# Patient Record
Sex: FEMALE | Race: WHITE | Employment: OTHER | URBAN - NONMETROPOLITAN AREA
[De-identification: names, ages, dates, MRNs, and addresses within clinical notes are randomized per-mention and may not be internally consistent; named-entity substitution may affect disease eponyms.]

---

## 2020-04-20 ENCOUNTER — HOSPITAL ENCOUNTER (OUTPATIENT)
Dept: ULTRASOUND IMAGING | Facility: OTHER | Age: 65
End: 2020-04-20
Attending: PHYSICIAN ASSISTANT
Payer: MEDICARE

## 2020-04-20 VITALS — RESPIRATION RATE: 16 BRPM | DIASTOLIC BLOOD PRESSURE: 78 MMHG | HEART RATE: 80 BPM | SYSTOLIC BLOOD PRESSURE: 140 MMHG

## 2020-04-20 DIAGNOSIS — N64.4 BREAST PAIN, LEFT: ICD-10-CM

## 2020-04-20 DIAGNOSIS — R92.8 ABNORMAL FINDING ON BREAST IMAGING: ICD-10-CM

## 2020-04-20 PROCEDURE — 76642 ULTRASOUND BREAST LIMITED: CPT | Mod: LT

## 2020-04-20 RX ORDER — LIDOCAINE HYDROCHLORIDE 10 MG/ML
20 INJECTION, SOLUTION EPIDURAL; INFILTRATION; INTRACAUDAL; PERINEURAL ONCE
Status: DISCONTINUED | OUTPATIENT
Start: 2020-04-20 | End: 2020-04-21 | Stop reason: HOSPADM

## 2020-04-20 RX ORDER — LIDOCAINE HYDROCHLORIDE AND EPINEPHRINE 10; 10 MG/ML; UG/ML
20 INJECTION, SOLUTION INFILTRATION; PERINEURAL ONCE
Status: DISCONTINUED | OUTPATIENT
Start: 2020-04-20 | End: 2020-04-21 | Stop reason: HOSPADM

## 2020-04-20 RX ORDER — MULTIPLE VITAMINS W/ MINERALS TAB 9MG-400MCG
1 TAB ORAL DAILY
COMMUNITY

## 2020-04-20 RX ORDER — CHLORAL HYDRATE 500 MG
2 CAPSULE ORAL DAILY
COMMUNITY

## 2020-04-20 RX ORDER — ATORVASTATIN CALCIUM 40 MG/1
40 TABLET, FILM COATED ORAL DAILY
COMMUNITY

## 2020-04-20 RX ORDER — CHOLECALCIFEROL (VITAMIN D3) 50 MCG
1 TABLET ORAL DAILY
COMMUNITY

## 2020-04-20 NOTE — DISCHARGE INSTRUCTIONS
"NEEDLE BIOPSY BREAST    Activity: Rest the remainder of the day. You may resume normal activity after the next day. Avoid any vigorous/strenuous physical activity for 24 hours.    Comfort: If you have discomfort or tenderness at the site you may take your usual or recommended pain medication. Do not take aspirin the day of the procedure or for 48 hours following the biopsy.    Diet: You may resume your usual diet.    Care of site: Leave ice pack in place for 4 hours, or until it is no longer cold. The ice pack is reusable and may be refrozen.  Keep your bra and the dressing on for 24 hours. Then you may remove the bandage and shower. If there are steri-strips you may remove them in 3 to 5 days.  You may have some discomfort and a small amount of bruising where the biopsy was performed. This is normal. For several days or even a couple of weeks, you may have tenderness or \"twinges\" and a tiny bump where the needle went into the skin. This can be bothersome, but is not abnormal. You can use warm moist washcloths, as this may help. Do Not Use A Heating Pad.    RETURN TO THE EMERGENCY ROOM FOR:   Shortness of breath   Rapid heart rate   If pain becomes worse    Call Your Doctor For:    A fever over 101 degrees   Increased redness, increased swelling, and/or persistent drainage/discomfort  around the site    Other: At the end of your breast biopsy, a tiny titanium clip will be inserted through the biopsy needle and placed at the biopsy site within your breast. The marker provides a landmark of the biopsy for further mammograms or surgical procedures. This marker is MRI compatible and poses no known health risks.    You will be receiving a letter in the mail from [facility] Mammography Department with your biopsy results.  In 6 months a mammogram will be needed to establish a new baseline and to recheck the area where the biopsy occurred. Our radiology department will call you to schedule an appointment.    For " questions, problems or concerns, contact the Radiology Department at [phone].

## 2020-04-20 NOTE — PROGRESS NOTES
Ultrasound performed.  Radiologist reviews and talks with patient about there not being a clear area to biopsy at this time.  Patient states pain has been better over the past few days.  Please see radiologist report.  Message left at [facility] for Diann Acevedo.

## 2020-04-20 NOTE — LETTER
Norma Reed  [address]  [place] [address]      April 21, 2020  Date of Exam: 4/20/20    Dear Norma Reed:    Thank you for your recent visit.  Breast Imaging Result: We are pleased to inform you that the results of your recent breast imaging show no evidence of malignancy (cancer).    If you are experiencing any breast problems such as a lump or localized pain we request that you discuss this with your health care provider if you haven t already done so, as additional testing may be necessary.    As you know, early detection of cancer is very important. Although mammography is the most accurate method for early detection, not all cancers are found through mammography. A thorough examination includes a combination of mammography, physical examination and breast self-examination. Currently the American College of Radiology and the Society of Breast Imaging recommend an annual mammogram for all women beginning at the age of 40.    A report of your breast imaging results was sent to: Diann Acevedo    Your breast imaging will become part of your medical file here at [place] for at least 10 years. You are responsible for informing any new health care provider or breast imaging facility of the date and location of this examination.    We appreciate the opportunity to participate in your health care.    Sincerely,    Kole Valverde MD  Interpreting Radiologist  [facility]

## 2021-07-20 ENCOUNTER — ONCOLOGY VISIT (OUTPATIENT)
Dept: RADIATION ONCOLOGY | Facility: HOSPITAL | Age: 66
End: 2021-07-20
Attending: INTERNAL MEDICINE
Payer: COMMERCIAL

## 2021-07-20 VITALS — WEIGHT: 220 LBS

## 2021-07-20 DIAGNOSIS — C23 MALIGNANT NEOPLASM OF GALLBLADDER (H): ICD-10-CM

## 2021-07-20 PROCEDURE — 99205 OFFICE O/P NEW HI 60 MIN: CPT | Performed by: RADIOLOGY

## 2021-07-20 PROCEDURE — G0463 HOSPITAL OUTPT CLINIC VISIT: HCPCS | Performed by: RADIOLOGY

## 2021-07-20 PROCEDURE — 99417 PROLNG OP E/M EACH 15 MIN: CPT | Performed by: RADIOLOGY

## 2021-07-20 RX ORDER — MULTIPLE VITAMINS W/ MINERALS TAB 9MG-400MCG
1 TAB ORAL
COMMUNITY
End: 2021-08-03

## 2021-07-20 RX ORDER — CHLORAL HYDRATE 500 MG
CAPSULE ORAL
COMMUNITY
End: 2021-08-03

## 2021-07-20 RX ORDER — IPRATROPIUM BROMIDE AND ALBUTEROL SULFATE 2.5; .5 MG/3ML; MG/3ML
SOLUTION RESPIRATORY (INHALATION)
COMMUNITY
Start: 2020-11-24

## 2021-07-20 RX ORDER — ATORVASTATIN CALCIUM 40 MG/1
20 TABLET, FILM COATED ORAL
COMMUNITY
Start: 2020-11-16

## 2021-07-20 NOTE — PROGRESS NOTES
"[facility]  DEPARTMENT OF RADIATION ONCOLOGY  CONSULTATION NOTE    Name: Norma Reed MRN: [id number]   : 1955 (66 year old)  Date of Service: 2021 Referring: Dr. Marcano       Diagnosis and Cancer Staging  Malignant neoplasm of gallbladder (H)  Staging form: Gallbladder, AJCC 8th Edition  - Clinical stage from 2021: Stage IIIB (cT2b, cN1, cM0) - Signed by Jordan Puri MD on 2021  - Pathologic stage from 2021: Stage IIIB (pT2b, pN1, cM0) - Signed by Jordan Puri MD on 2021      Summary of Problems   (Please note that EMR interfaces between institutions can result in loss of the embedded images).     \"Kimberly" is a very pleasant 66 year old woman who is actively treated for gallbladder cancer. Medical oncology referred the patient for consultation about the role of adjuvant concurrent chemoradiation (Primary: Upfront resection yielded 6.5-cm disease involving the entire length of the gallbladder, extension through the muscularis propria into surrounding adipose tissue, and positive liver parenchymal margin on initial resection. Radial margins clinically suspected to be positive. Subsequent resection of liver segments 4B/5 negative for malignancy after adjuvant chemotherapy.. Nodes: Clinically presumed 2.2-cm node of Calot removed at time of cholecystectomy and only designated as \"M1\" disease. Likely 2.1-cm retroperitoneal node behind the portal vein. 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 Markers: Postoperatively rising CA 19-9 measuring 560 on 2021). Note that the clinical and pathologic staging is based upon postsurgical interpretation of the patient's disease. Cholecystectomy on 2021 revealed an incidental locally advanced gallbladder cancer. \"Neoadjuvant\" FOLFOX chemotherapy completed on 2021. Partial hepatectomy and lymphadenectomy performed on 2021. Among the additional co-morbidities " "and social determinants affecting toxicity risk are obesity, gastroesophageal reflux disease, and chronic obstructive pulmonary disease (COPD),  7/1/2021      History of Present Illness (CE)   The patient's oncologic history across multiple institutions is abstracted as follows:    Diagnosis/Staging/Treatment:    1/13/2021 Elective cholecystectomy: Presented with persistent right upper quadrant abdominal pain and food intolerances. Operative note describes incidental finding of a firm gallbladder. No description of peritoneal or hepatic suspicious findings. Pathology revealed a 6.5-cm moderately-differentiated invasive adenocarcinoma involving the entire bladder and extending through the muscularis propria into the surrounding adipose tissue. Liver parenchymal margin was positive; cystic duct margin negative by 3-mm. No peritoneal disease reported during the procedure. Designated as pathologic [id number] diease (metastatic site thought to be a 2.2-cm separate nodule that was not designated as a lymph node).    1/20/2021 CT abdomen and pelvis with contrast: Postoperative changes. No reported suspicious abdominal or pelvic masses (reinterpreted on 1/22/2021 by surgical oncology).    1/22/2021 Surgical oncology consultation: Surgical radial margins felt to be clinically suspicious. Image review suspicious for a retroperitoneal node behind the portal vein measuring measuring 2.1-cm with central hypoenhancement. Designated as clinical [id number] with a likely completely replaced node of Calot rather than intraperitoneal metastasis. Oncology team ultimately recommended \"neoadjuvant\" chemotherapy followed by re-evaluation for a \"definitive\" resection.    2/1/2021 FOLFOX chemotherapy initiated.    4/12/2021 FOLFOX chemotherapy completed. 6 cycles received.    5/24/2021 Laparoscopic procedure converted to open portal lymphadenectomy: Liver specimen (4B and 5) was negative for residual malignancy. At least one node positive for " "mucinous adenocarcinoma and demonstrated extranodal extension.    2021 CA 19-9 tumor marker: Serial rises from post chemotherapy 48 on 2021, postsurgical 145 on 2021, and 560 on 2021.    2021 CT of chest, abdomen, and pelvis with contrast: Clinically suspicious possible node posterior to the pancreas. Radiographically, reported \"low density but slightly heterogeneous material\" present along the medial aspect of the right lobe resection of uncertain etiology. Postoperative change versus early recurrence of neoplasm considered. No suspicious regional adenopathy or distant metastatic disease.     2021 Medical oncology follow-up: Oncology team regarded the prognosis as poor. Salvage therapy unlikely to achieve a curative outcome. Salvage therapy also unlikely to achieve palliation since the presumed recurrence is asymptomatic. Nevertheless, the patient is highly functional with an ECOG status of 0 and interested in seeking potentially curative treatment. Surgery deemed not appropriate, but team recommended consideration of chemotherapy or concurrent chemoradiation (5-FU) due to suspected persistent disease in the operative bed as suggested by the suspicious CT findings and rising CA 19-9 tumor marker. Patient expressed a preference for chemoradiation with potentially curative intent. Patient seeking second opinion at [facility].    Pendin2022 Consultation at [facility] for second opinion. CT scheduled later in the month.    We reviewed other conditions that can influence the choice of therapy and its morbidity. Regarding goals of care, the patient stated, \"I have a lot of things I want to do exhalation). She is accustomed to when active physically and social lifestyle. We spoke about her experiences to date with oncologic therapies and the impact of various toxicities on her life and livelihood. She spoke about the \"emotional toll on pressure\" that her disease has taken on her and her family " since her original diagnosis and the increasing pressures with each subsequent therapeutic intervention. She denies a history of Crohn's disease, ulcerative, or collagen vascular diseases. She denies a prior history of radiation exposure. We counseled the patient about COVID-19 risks, precautions, and potential impact on her radiation therapy. She denied recent known exposure to COVID-19, risky behaviors, or related symptoms including febrile, chest, gustatory, gastrointestinal, and systemic complaints.    Chemotherapy History: Yes (as above).  Radiation History: No.  Implanted Cardiac Devices: No.  Implanted Chest Wall Infusion Port: Left chest wall port.    Next 5 appointments (look out 90 days)    2021  9:30 AM  CONSULT with Jordan Puri MD  [facility] ([facility] ) [address] [place] [id number] [phone]        Past Medical History:   Diagnosis Date     COPD (chronic obstructive pulmonary disease) (H)      COVID-19     required oxygen at home and dexamethasone     Dyslipidemia      GERD (gastroesophageal reflux disease)      Malignant neoplasm of gallbladder (H) 2021     Obesity      Past Surgical History:   Procedure Laterality Date      SECTION      twice     CHOLECYSTECTOMY  2021     HEPATECTOMY PARTIAL  2021     TUBAL LIGATION       Outpatient Encounter Medications as of 2021   Medication Sig Dispense Refill     atorvastatin (LIPITOR) 40 MG tablet Take 20 mg by mouth       fluticasone-salmeterol (ADVAIR DISKUS) 250-50 MCG/DOSE inhaler INHALE 1 DOSE BY MOUTH TWICE DAILY       atorvastatin (LIPITOR) 40 MG tablet Take 40 mg by mouth daily       fish oil-omega-3 fatty acids 1000 MG capsule Take 2 g by mouth daily       ipratropium - albuterol 0.5 mg/2.5 mg/3 mL (DUONEB) 0.5-2.5 (3) MG/3ML neb solution NEBULIZE 1 VIAL VIA NEBULIZER 4 TIMES DAILY AS       multivitamin w/minerals (THERA-VIT-M) tablet Take 1 tablet by  mouth daily       omeprazole (PRILOSEC) 20 MG DR capsule Take 20 mg by mouth 2 times daily Twice a day every other day       vitamin D3 (CHOLECALCIFEROL) 2000 units (50 mcg) tablet Take 1 tablet by mouth daily       [DISCONTINUED] fish oil-omega-3 fatty acids 1000 MG capsule        [DISCONTINUED] multivitamin w/minerals (THERA-VIT-M) tablet Take 1 tablet by mouth       No facility-administered encounter medications on file as of 7/20/2021.    No orders of the defined types were placed in this encounter.    Allergies: Penicillins    Social History   Patient is , lives with her , and and has 2 grown daughters. She is retired from a number of different jobs and industries.  Tobacco Use     Smoking status: Former Smoker     Smokeless tobacco: Former User     Quit date: 1994   Substance Use Topics     Alcohol use: Not Currently     Drug use: None     Family History   Problem Relation Age of Onset     Lung Cancer Father    No other cancers in first or second degree relatives.    Review of Systems (supplemental to the ROS documented in the EMR and the HPI)   Review of Systems - OncologyPain: Data Unavailable.    Physical Exam   KPS: 80 (effort for normal activity; some signs or symptoms of disease).  ECOG Status: 0 (Fully active, able to carry on all activities without restriction)  1 (Restricted in physically strenuous activity but ambulatory and able to carry out work of a light or sedentary nature)  Vitals: Wt 99.8 kg (220 lb)     Wt Readings from Last 3 Encounters:   07/20/21 99.8 kg (220 lb)      Clinical Examination:  General: Appears clinically stable. Appears in good general health. Appears obese. Appears rested. Appears stated age. Appears well-developed, well-nourished, and in no acute distress. Does not appear acutely or chronically ill. Appears well groomed.  Head: No alopecia. Normocephalic.  Eyes: Glasses. Anicteric, eyelids symmetric, vision intact.  ENT: Good volume. No hoarseness.  Neck:  Soft, supple, symmetric, trachea midline.  Lymphatics: No parotid, periparotid, cervical, supraclavicular, or periumbilical adenopathy.  Chest/Breasts: Left chest wall port site is clean, dry, and intact. No erythema, discharge, or cellulitis. No implanted cardiac device.   Lungs: Easy respirations, no use of accessory muscles. Clear to auscultation  Cardiovascular: No jugulovenous distension. No carotid pulsations. RRR, S1, S2.  Abdomen: Well healed surgical scars. Nondistended, nontender. Soft. Bowel sounds positive.  Pelvis: Nondistended, nontender. Soft. Bowel sounds positive.  MSK: Shoulder range of motion is good. No arm edema or hand edema. Good muscle tone. Good posture. No tenderness on palpation. No cords or calf tenderness.  Integument: No jaundice or pallor. No cellulitis. No ecchymosis.  Neurologic: Wide-based gait. Alert, oriented, fluent. Memory intact. Motor intact. Sensory intact.  Psychologic: Well-spoken and well read about her cancer and therapies. Lately dogmatic at times. Good dynamic with daughters. Clear, consistent thoughts and opinions. Led part of the discussion. Good level of critical thinking and intelligence. Good comprehension and processing of new information. Did not require repeating of questions or answers. Progressively complex discussions and questions. Well-constructed answers. Complex speech and though patterns. Appropriately anxious about radiation therapy and sad about diagnosis. Pleasant, cooperative, insightful, concrete, and good judgment.    Time on Day of Encounter, and MDM Data Reviewed and Analyzed on Day of Encounter   Time spent on patient activities is based on Chart review 78 minutes, Visit 40 minutes, and Documentation 27 minutes. Total time: 145 minutes.    MDM based on:       High risk element:  o Locoregionally-advanced gallbladder cancer. Lingering toxicities associated with surgery and chemotherapy. Pending concurrent chemoradiation (possible).       Tests,  documents, or independent historian(s) analyses include but are not limited to:  o Those referenced above in the bulleted HPI.       Independent Interpretations of tests include but are not limited to:  o As noted above.       Discussion with the medical team about management or test interpretation include but are not limited to:  o Reviewed management with medical oncology.    Information Review   I reviewed the case with the patient and her daughters. Once daughter was in clinic; the other daughter attempted to participate via speaker phone (the call repeatedly dropped might extended attempts to maintain that daughter's participation). I evaluate the patient, discussed her treatment options, and explored potential risks of radiation therapy with or without concurrent chemotherapy. I reviewed the medical records, radiographic information, and pathologic studies. I reviewed the imaging studies via PACS. I reviewed the case with medical oncology. I reviewed the  patient intake sheets. I offered to speak with other family members and friends today by speakerphone. The patient declined my offer but granted me permission to speak with them if they contact me or come to clinic. The patient and her daughters are fair historians, began the visit with good insights into the disease process, and acknowledged the potentially poor consequences of her disease. The daughters in particular have educated themselves through a variety of educational media including the Internet. They demonstrated good comprehension of our discussion and explored the treatment options with good understanding. They asked pertinent questions and insightful follow-up questions. I illustrated the basic anatomy and field of treatment. We discussed the onsite and telemedicine coverage of our clinic. She declined referral for an additional opinion beyond [facility]. She declined conservative care. They accepted referral to our social work staff for additional  resources including potential counseling. The patient granted me permission to exchange medical information and records with other physicians. No therapeutic radiation protocols for the patient's disease are available at our Center.    We discussed the patient's diagnosis of gallbladder cancer, regional anatomy, stage designation, and risk-group. We reviewed patterns of failure after different treatments and poor prognosis even with aggressive multimodality treatment. We discussed factors specific to her disease including personal circumstances, comorbidities, and other factors impacting the risk of toxicity and clinical outcomes. We reviewed the concept of multimodality care and the relative contribution of each to the paradigm of treating gall bladder cancer. We discussed the use of radiation therapy an adjuvant therapy with unclear benefit for her clinical situation. We discussed limitations of radiographic imaging in identifying the extent of disease. We discussed the potentially beneficial role of radiation therapy in the context of evolving standards and national guidelines of oncologic care such as the NCCN, ACR, and CONSUELO as well as management during the COVID-19 pandemic.    We discussed the nature of external beam radiation therapy from a Stratos TrueBeam linac, concept of CT-based simulation, role of computerized treatment planning, and potential interventions to reduce the toxicity of radiation while improving the efficacy of treatment for gallbladder cancer. We would likely use image-guidance of either 3D or VMAT beam arrangements. Together, these techniques permit good coverage of complex target tissues (operative bed, regional nodes) while maintaining adequate sparing of normal critical structures (liver, duodenum, kidney, spinal cord,, etc.). We would use 4D-CT simulation for incorporation of respiratory motion control to assess the impact of pulmonary and cardiac movement on radiation therapy. I  would not use stereotactic radiation therapy, protons, TomoTherapy, brachytherapy, or radioprotectant agents. I do not feel that these methods offer a clear benefit for this patient.  particularly with the doses used for cancer and the absence of complicating anatomical geometry or comorbid conditions. The patient appears to have no absolute contraindications to radiation therapy.    We discussed the relative risks, benefits, rationale, potential side effects, alternatives, and adjuncts to radiation therapy for node-positive gallbladder cancer. Toxicities can be acute or chronic, and can negatively impact long-term quality of life. They can require high levels of supportive care and breaks in radiation therapy. I anticipate at least a moderated degree of acute gastrointestinal toxicity. Among the factors will increase the risk of toxicity are the co-morbidities and circumstances notes above. The toxicities include but are not limited to the the hepatobiliary system, duodenum, small bowel, stomach, large bowel, pancreas, kidney, spinal cord, lymphatic system, hematopoietic system, lung, skin, abdominal wall, pain, infection, and other organs as well as systemic toxicities (e.g., fatigue) and long-term risks such as second malignancy.     Assessment/Plan   In summary, I slightly favor chemotherapy alone over concurrent chemoradiation as additional adjuvant therapy for locoregionally-advanced gallbladder cancer. The addition of radiation therapy to chemotherapy has uncertain benefit even when visible residual disease is present after surgery. History of radiation therapy would not be anticipated to significantly improve the patient's longevity or probability of cure. However, the addition of radiation therapy would likely significantly increase the degree of acute and long-term toxicity, particularly with regard to the duodenum, hepatobiliary system, and perhaps the pancreas. Dose to the ipsilateral kidney might also  be significant. If the consensus recommendation is ultimately to add radiation therapy to the adjuvant regimen, I would request a PET-CT to assist with delineation of visible target structures. Additional options include observation versus transition to best supportive care. The patient and her daughters wish to proceed with their second opinion at [facility] since they plan to accept the recommendation of that institution. I counseled the patient that her local surgeon and local medical oncologist have insight to knowledge of her disease that cannot be duplicated elsewhere. I counseled the patient that her local surgical oncologist has provided expert insights, guidance, and management of her disease. I counseled the patient that her local medical oncologist has provided thoughtful, insightful, and expert management of her difficult disease despite the toxicities, and with her chemotherapy regimen. This time, no follow-up is scheduled with my service. Guidance about the role of radiation therapy will be determined by the extended multidisciplinary oncology team. The patient and her family wish to proceed as recommended. Answered all questions to their satisfaction. Thank you for allowing us to participate in the care of this very pleasant patient.      Jordan Puri M.D., Ph.D.  Department of Radiation Oncology  Tel: [phone]  Fax: [phone]    cc:  Ulises Marcano III, M.D. (medical oncology)  Wayne Yusuf M.D. (surgery)  HARRIS Rodriguez (medicine)  Joe Guzman M.D. (medicine)

## 2021-08-03 PROBLEM — D62 ACUTE POSTOPERATIVE ANEMIA DUE TO EXPECTED BLOOD LOSS: Status: ACTIVE | Noted: 2021-05-25

## 2021-08-20 ENCOUNTER — DOCUMENTATION ONLY (OUTPATIENT)
Dept: RADIATION ONCOLOGY | Facility: HOSPITAL | Age: 66
End: 2021-08-20

## 2021-08-21 NOTE — PROGRESS NOTES
Radiation Oncology - Clinic Note      I reviewed the EMR including the patient's medical oncology consultation at [facility] on 8/18/2021 with Dr. NIDA Huertas. The service concurred with the recommendation of chemotherapy rather than upfront radiation/chemoradiation. Afterward, the patient would be reevaluated for her candidacy for chemoradiation for salvage. The patient will therefore follow-up with me as needed.      Jordan Puri M.D., Ph.D.  Department of Radiation Oncology  Tel: [phone]  Fax: [phone]

## (undated) RX ORDER — LIDOCAINE HYDROCHLORIDE 10 MG/ML
INJECTION, SOLUTION INFILTRATION; PERINEURAL
Status: DISPENSED
Start: 2020-04-20